# Patient Record
Sex: MALE | Race: WHITE | NOT HISPANIC OR LATINO | URBAN - METROPOLITAN AREA
[De-identification: names, ages, dates, MRNs, and addresses within clinical notes are randomized per-mention and may not be internally consistent; named-entity substitution may affect disease eponyms.]

---

## 2021-08-29 ENCOUNTER — EMERGENCY (EMERGENCY)
Facility: HOSPITAL | Age: 61
LOS: 1 days | Discharge: HOME | End: 2021-08-29
Attending: EMERGENCY MEDICINE
Payer: COMMERCIAL

## 2021-08-29 VITALS
DIASTOLIC BLOOD PRESSURE: 93 MMHG | OXYGEN SATURATION: 97 % | SYSTOLIC BLOOD PRESSURE: 144 MMHG | RESPIRATION RATE: 18 BRPM | HEART RATE: 71 BPM | WEIGHT: 138.01 LBS | TEMPERATURE: 99 F | HEIGHT: 65 IN

## 2021-08-29 DIAGNOSIS — Z98.890 OTHER SPECIFIED POSTPROCEDURAL STATES: Chronic | ICD-10-CM

## 2021-08-29 PROCEDURE — 99284 EMERGENCY DEPT VISIT MOD MDM: CPT

## 2021-08-29 PROCEDURE — 99282 EMERGENCY DEPT VISIT SF MDM: CPT

## 2021-08-29 RX ORDER — IBUPROFEN 200 MG
1 TABLET ORAL
Qty: 20 | Refills: 0
Start: 2021-08-29 | End: 2021-09-02

## 2021-08-29 RX ORDER — LIDOCAINE 4 G/100G
1 CREAM TOPICAL
Qty: 4 | Refills: 0
Start: 2021-08-29 | End: 2021-09-01

## 2021-08-29 RX ORDER — DIAZEPAM 5 MG
0.5 TABLET ORAL
Qty: 3 | Refills: 0
Start: 2021-08-29 | End: 2021-08-31
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
## 2021-08-29 NOTE — ED PROVIDER NOTE - NS ED ROS FT
ROS: CONTUSIONAL: Denies fever, chills, fatigue, wt loss. HEAD: Denies trauma, HA, Dizziness. EYE: Denies Acute visual changes, diplopia. ENMT: Denies change in hearing, tinnitus, epistaxis, difficulty swallowing, sore throat. CARDIO: Denies CP, palpitations, edema. RESP: Denies Cough, SOB , Diff breathing, hemoptysis. GI: Denies N/V, ABD pain, change in bowel movement. URINARY: Denies difficulty urinating, pelvic pain. MS: + back pain, Denies joint pain, weakness, decreased ROM, swelling. NEURO: Denies change in gait, seizures, loss of sensation, dizziness, confusion LOC.  PSY: NO SI/HI. SKIN: Denies Rash, bruising.
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
## 2021-08-29 NOTE — ED PROVIDER NOTE - ADDITIONAL NOTES AND INSTRUCTIONS:
PT was evaluated At [facility] ED and was found to have a condition that warranted time of to rest and heal from WORK/SCHOOL.   Wilmar Camarillo PA-C

## 2021-08-29 NOTE — ED PROVIDER NOTE - NSFOLLOWUPCLINICS_GEN_ALL_ED_FT
[facility]  Ortho/Spine  [address] [place] [address]  Phone: [phone]  Fax:

## 2021-08-29 NOTE — ED PROVIDER NOTE - PATIENT PORTAL LINK FT
You can access the FollowMyHealth Patient Portal offered by [facility] by registering at the following website: [email or web address]. By joining [facility]’s FollowMyHealth portal, you will also be able to view your health information using other applications (apps) compatible with our system.
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
## 2022-12-07 NOTE — ED PROVIDER NOTE - NSFOLLOWUPCLINICSTOKEN_GEN_ALL_ED_FT
12/7/2022         RE: Crispin Rojas  [address] [facility] [address]        Dear Colleague,    Thank you for referring your patient, Crispin Rojas, to the [facility]. Please see a copy of my visit note below.    [facility]  CLINIC NOTE    CHIEF COMPLAINT:  right foot    PATIENT HISTORY:  Crispin Rojas is a 60 year old male who follows up for right foot. Had procedure on 11/30. States some soreness following, but overall is tolerable. States has not taken pain medicine. States he hasn't been walking much. States dressing has been in place since surgery. Denies N/F/V/C/D.         Review of Systems:  A 10 point review of systems was performed and is positive for that noted above in the patient history.  All other areas are negative.     PAST MEDICAL HISTORY:   Past Medical History:   Diagnosis Date     Cerebral infarction (H)     2010     Chronic infection      H/O blood clots 2022     Hyperlipidemia LDL goal <70      Hypertension      Numbness and tingling      Obesity      GILA (obstructive sleep apnea) 10/22/2018    CPAP intolerant     PVD (peripheral vascular disease) (H)     s/p left partial toe amputation     Renal stone      Tremor      Type 2 diabetes mellitus with diabetic polyneuropathy, with long-term current use of insulin (H)         PAST SURGICAL HISTORY:   Past Surgical History:   Procedure Laterality Date     AMPUTATE FOOT Left 8/18/2016    Procedure: AMPUTATE FOOT;  Surgeon: Jack Younger DPM;  Location: [facility] OR     AMPUTATE TOE(S) Right 2/1/2016    Procedure: AMPUTATE TOE(S);  Surgeon: Rachelle Manriquez DPM, Pod;  Location: [facility] OR     AMPUTATE TOE(S) Right 8/2/2019    Procedure: Right fourth toe partial amputation for treatment of osteomyelitis.;  Surgeon: Jack Younger DPM;  Location: [facility] OR     AMPUTATE TOE(S) Left 11/8/2019    Procedure: Left second toe amputation at the metatarsophalangeal joint.;  Surgeon: Declan  Rachelle BUSTOS DPM, Podiatry/Foot and Ankle Surgery;  Location: [facility] OR     AMPUTATE TOE(S) Right 6/5/2022    Procedure: AMPUTATION OF RIGHT GREAT TOE;  Surgeon: Wili Quiles DPM;  Location: [facility]     AMPUTATE TOE(S) Right 7/28/2022    Procedure: Right foot partial first metatarsal resection;  Surgeon: Tiny Soto DPM;  Location: [facility] OR     ANGIOGRAM       BIOPSY BONE FOOT Right 7/24/2022    Procedure: Bone biopsy, right first metatarsal.;  Surgeon: Valentino Molina DPM;  Location: [facility] OR     COLONOSCOPY       COMBINED CYSTOSCOPY, RETROGRADES, URETEROSCOPY, INSERT STENT Left 8/21/2016    Procedure: COMBINED CYSTOSCOPY, RETROGRADES, URETEROSCOPY, INSERT STENT;  Surgeon: Artemio Valenzuela MD;  Location: [facility] OR     COMBINED CYSTOSCOPY, RETROGRADES, URETEROSCOPY, LASER HOLMIUM LITHOTRIPSY URETER(S), INSERT STENT Left 10/3/2016    Procedure: COMBINED CYSTOSCOPY, RETROGRADES, URETEROSCOPY, LASER HOLMIUM LITHOTRIPSY URETER(S), INSERT STENT;  Surgeon: Artemio Valenzuela MD;  Location: [facility]     EXTRACORPOREAL SHOCK WAVE LITHOTRIPSY (ESWL) Left 9/8/2016    Procedure: EXTRACORPOREAL SHOCK WAVE LITHOTRIPSY (ESWL);  Surgeon: Artemio Valenzuela MD;  Location: [facility] OR     INCISION AND DRAINAGE FOOT, COMBINED Right 7/24/2022    Procedure: Incision and drainage, right foot ;  Surgeon: Valentino Molina DPM;  Location: [facility]     OSTEOTOMY FOOT Right 11/30/2022    Procedure: 1. Right second metatarsal floating osteotomy  2. Right second digit proximal phalanx arthroplasty 3. Right percutaneous flexor tenotomy;  Surgeon: Tiny Soto DPM;  Location: [facility]     RECESSION GASTROCNEMIUS Right 2/1/2016    Procedure: RECESSION GASTROCNEMIUS;  Surgeon: Rachelle Manriquez DPM, Pod;  Location: [facility] OR        MEDICATIONS:  Reviewed in Epic.     ALLERGIES:    Allergies   Allergen Reactions     Hmg-Coa-R Inhibitors Swelling     Other reaction(s): Other (see comments)  SIMCOR caused edema in extremities       Bactrim  [Sulfamethoxazole W/Trimethoprim] Nausea and Vomiting     Sulfa Drugs Nausea     Niacin Swelling     SIMCOR caused edema in extremities     Simvastatin Swelling     SIMCOR caused edema in extremities        SOCIAL HISTORY:   Social History     Socioeconomic History     Marital status:      Spouse name: Sydney     Number of children: 2     Years of education: Not on file     Highest education level: Master's degree (e.g., MA, MS, [place], MEd, MSW, ELIANA)   Occupational History     Occupation: marketing     Employer: "[employer]     Comment: [employer]   Tobacco Use     Smoking status: Never     Smokeless tobacco: Never   Vaping Use     Vaping Use: Never used   Substance and Sexual Activity     Alcohol use: Yes     Alcohol/week: 0.0 standard drinks     Comment: rare---red wine 3x per month     Drug use: No     Sexual activity: Not Currently     Partners: Female   Other Topics Concern     Parent/sibling w/ CABG, MI or angioplasty before 65F 55M? No   Social History Narrative     Not on file     Social Determinants of Health     Financial Resource Strain: Low Risk      Difficulty of Paying Living Expenses: Not very hard   Food Insecurity: No Food Insecurity     Worried About Running Out of Food in the Last Year: Never true     Ran Out of Food in the Last Year: Never true   Transportation Needs: No Transportation Needs     Lack of Transportation (Medical): No     Lack of Transportation (Non-Medical): No   Physical Activity: Sufficiently Active     Days of Exercise per Week: 4 days     Minutes of Exercise per Session: 40 min   Stress: No Stress Concern Present     Feeling of Stress : Not at all   Social Connections: Moderately Isolated     Frequency of Communication with Friends and Family: Once a week     Frequency of Social Gatherings with Friends and Family: Never     Attends Yazidi Services: More than 4 times per year     Active Member of Clubs or Organizations: No     Attends Club or Organization Meetings: Not  on file     Marital Status:    Intimate Partner Violence: Not on file   Housing Stability: Low Risk      Unable to Pay for Housing in the Last Year: No     Number of Places Lived in the Last Year: 2     Unstable Housing in the Last Year: No        FAMILY HISTORY:   Family History   Problem Relation Age of Onset     Arthritis Mother         SLE     Connective Tissue Disorder Mother         lupus     Diabetes Mother      Cerebrovascular Disease Mother      Cancer Mother      Diabetes Father      Diabetes Maternal Grandfather      Diabetes Sister         EXAM:Vitals: BP (!) 140/82   Wt 106.1 kg (234 lb)   BMI 32.64 kg/m    BMI= Body mass index is 32.64 kg/m .      General appearance: Patient is alert and fully cooperative with history & exam.  No sign of distress is noted during the visit.      Respiratory: Breathing is regular & unlabored while sitting.      HEENT: Hearing is intact to spoken word.  Speech is clear.  No gross evidence of visual impairment that would impact ambulation.      Dermatologic:  -Submet 2 right foot ulcer now to subcutaneous tissue measures 1.5 cm x 1 cm x .2 cm. Granular wound bed. No cellulitis. Site drier and periwound healthier. Dorsal site incision intact and closed appropriately. No cellulitis.   Left foot submet 3 callus/preuclerative lesion, debrided to level of dermis. No open lesions or cellulitis.      Vascular: Dorsalis pedis and posterior tibial pulses are intact & regular bilaterally.  CFT and skin temperature is normal to both lower extremities.       Neurologic: Lower extremity sensation is diminished, bilateral foot, to light touch.  No evidence of neurological-based weakness or contracture in the lower extremities.       Musculoskeletal: Patient is ambulatory without an assistive device or brace.  S/p right first ray resection. Right 2nd digit --> now more rectus.   S/p left hallux and second digit amputations     Psychiatric: Affect is pleasant &  appropriate.    Imaging:        ASSESSMENT:  1. Right foot submet two ulcer s/p first ray resection --> now s/p 2nd metatarsal floating osteotomy with hammertoe correction (proximal phalanx excision, flexor tenotomy)     2. Left foot submet two ulcer  --> preulcerative --> remain stable.      MEDICAL DECISION MAKING:   -Patient seen for right foot. No significant pain or edema. Incision sites intact.     -Discussed need for continued minimal activity given that bone will healing over the next 4-6 weeks. Presents in CAM boot. Is anxious to get to golfing, exercise and work. Discussed with him that significant activity will lead to increased pain, swelling and delayed osseous union. He's also at risk for incisional dehiscence with too much activity.     -Plan for patient to be off of work for the rest of the week. Next week, okay to return to work as long as is primarily seated. Must wear CAM boot at all times.     -Will see back in 2 weeks.     Tiny Soto DPM   [facility]/Foot & Ankle Surgery                    Again, thank you for allowing me to participate in the care of your patient.        Sincerely,        Tiny Soto DPM     [id number]: || ||00\01||False;